# Patient Record
Sex: MALE | Race: BLACK OR AFRICAN AMERICAN | Employment: UNEMPLOYED | URBAN - METROPOLITAN AREA
[De-identification: names, ages, dates, MRNs, and addresses within clinical notes are randomized per-mention and may not be internally consistent; named-entity substitution may affect disease eponyms.]

---

## 2021-09-05 ENCOUNTER — HOSPITAL ENCOUNTER (EMERGENCY)
Age: 66
Discharge: HOME | End: 2021-09-05
Attending: EMERGENCY MEDICINE
Payer: MEDICAID

## 2021-09-05 ENCOUNTER — APPOINTMENT (OUTPATIENT)
Dept: GENERAL RADIOLOGY | Age: 66
End: 2021-09-05
Attending: PHYSICIAN ASSISTANT
Payer: MEDICAID

## 2021-09-05 VITALS
WEIGHT: 240 LBS | HEART RATE: 82 BPM | TEMPERATURE: 98.1 F | RESPIRATION RATE: 16 BRPM | SYSTOLIC BLOOD PRESSURE: 140 MMHG | DIASTOLIC BLOOD PRESSURE: 90 MMHG | BODY MASS INDEX: 39.99 KG/M2 | HEIGHT: 65 IN | OXYGEN SATURATION: 98 %
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
## 2021-09-05 DIAGNOSIS — S46.911A ELBOW STRAIN, RIGHT, INITIAL ENCOUNTER: ICD-10-CM

## 2021-09-05 DIAGNOSIS — M19.029 ELBOW ARTHRITIS: ICD-10-CM

## 2021-09-05 PROCEDURE — 73080 X-RAY EXAM OF ELBOW: CPT

## 2021-09-05 PROCEDURE — 99283 EMERGENCY DEPT VISIT LOW MDM: CPT

## 2021-09-05 RX ORDER — DICLOFENAC POTASSIUM 50 MG/1
50 TABLET, FILM COATED ORAL
Qty: 30 TABLET | Refills: 0 | Status: SHIPPED | OUTPATIENT
Start: 2021-09-05

## 2021-09-05 NOTE — Clinical Note
[id number] [address] EMERGENCY DEPT  [address] [facility] [id number]  [phone]    Work/School Note    Date: 9/5/2021    To Whom It May concern: Severa Leeks Choice was seen and treated today in the emergency room by the following provider(s):  Attending Provider: Jaspreet Stahl MD  Physician Assistant: CAROLEE Grossman. Severa Leeks Choice is excused from work/school on 09/05/21 and 09/06/21. He is medically clear to return to work/school on 9/7/2021.        Sincerely,          CAROLEE Cary

## 2021-09-05 NOTE — ED TRIAGE NOTES
Pt states he fell on Friday and injured right elbow. Pt states he has had several falls recently.      Brii Lawrence RN

## 2021-09-05 NOTE — ED NOTES
I have reviewed discharge instructions with the patient. The patient verbalized understanding. Patient left ED via Discharge Method: ambulatory to Home with self    Opportunity for questions and clarification provided. Patient given 1 scripts. To continue your aftercare when you leave the hospital, you may receive an automated call from our care team to check in on how you are doing. This is a free service and part of our promise to provide the best care and service to meet your aftercare needs.  If you have questions, or wish to unsubscribe from this service please call [phone]. Thank you for Choosing our [facility] Emergency Department.

## 2021-09-05 NOTE — ED PROVIDER NOTES
Patient states that he tripped and fell subsequently landing on an outstretched arm. He has pain to his right elbow. He is right-handed. It hurts to bend it. He did not hit his head nor did he have any loss of consciousness. There are no open wounds. He did ambulate to triage without difficulty and is well-hydrated. The history is provided by the patient. Fall  The accident occurred 2 days ago. There was no blood loss. The point of impact was the left elbow and right elbow. The pain is present in the right elbow. The pain is at a severity of 8/10. The pain is moderate. He was ambulatory at the scene. There was no entrapment after the fall. There was no drug use involved in the accident. There was no alcohol use involved in the accident. Pertinent negatives include no visual change, no fever, no numbness, no abdominal pain, no bowel incontinence, no nausea, no vomiting, no hematuria, no headaches, no extremity weakness, no hearing loss, no loss of consciousness, no tingling and no laceration. The symptoms are aggravated by activity. He has tried nothing for the symptoms. Past Medical History:   Diagnosis Date    Heart failure ([address]     Hypertension        Past Surgical History:   Procedure Laterality Date    HX ORTHOPAEDIC  shoulder         History reviewed. No pertinent family history. Social History     Socioeconomic History    Marital status:      Spouse name: Not on file    Number of children: Not on file    Years of education: Not on file    Highest education level: Not on file   Occupational History    Not on file   Tobacco Use    Smoking status: Never Smoker   Substance and Sexual Activity    Alcohol use:  Yes    Drug use: No    Sexual activity: Never   Other Topics Concern    Not on file   Social History Narrative    Not on file     Social Determinants of Health     Financial Resource Strain:     Difficulty of Paying Living Expenses:    Food Insecurity:     Worried About Running Out of Food in the Last Year:     Doni of Food in the Last Year:    Transportation Needs:     Lack of Transportation (Medical):  Lack of Transportation (Non-Medical):    Physical Activity:     Days of Exercise per Week:     Minutes of Exercise per Session:    Stress:     Feeling of Stress :    Social Connections:     Frequency of Communication with Friends and Family:     Frequency of Social Gatherings with Friends and Family:     Attends Gnosticist Services:     Active Member of Clubs or Organizations:     Attends Club or Organization Meetings:     Marital Status:    Intimate Partner Violence:     Fear of Current or Ex-Partner:     Emotionally Abused:     Physically Abused:     Sexually Abused: ALLERGIES: Patient has no known allergies. Review of Systems   Constitutional: Negative. Negative for fever. HENT: Negative. Eyes: Negative. Respiratory: Negative. Cardiovascular: Negative. Gastrointestinal: Negative. Negative for abdominal pain, bowel incontinence, nausea and vomiting. Genitourinary: Negative. Negative for hematuria. Musculoskeletal: Negative. Negative for extremity weakness. Right elbow pain   Skin: Negative. Neurological: Negative. Negative for tingling, loss of consciousness, numbness and headaches. Psychiatric/Behavioral: Negative. All other systems reviewed and are negative. Vitals:    09/05/21 0932   BP: (!) 140/90   Pulse: 82   Resp: 16   Temp: 98.1 °F (36.7 °C)   SpO2: 98%   Weight: 108.9 kg (240 lb)   Height: 5' 5\" (1.651 m)            Physical Exam  Vitals and nursing note reviewed. Constitutional:       Appearance: He is well-developed. HENT:      Head: Normocephalic and atraumatic.       Right Ear: External ear normal.      Left Ear: External ear normal.      Nose: Nose normal.      Mouth/Throat:      Mouth: Mucous membranes are moist.   Eyes:      Conjunctiva/sclera: Conjunctivae normal.      Pupils: Pupils are equal, round, and reactive to light. Cardiovascular:      Rate and Rhythm: Normal rate. Pulses: Normal pulses. Pulmonary:      Effort: Pulmonary effort is normal.   Abdominal:      Palpations: Abdomen is soft. Musculoskeletal:         General: Tenderness and signs of injury present. No swelling. Right elbow: Swelling present. Decreased range of motion. Tenderness present. Arms:       Cervical back: Normal range of motion and neck supple. Skin:     General: Skin is warm and dry. Findings: No laceration. Neurological:      Mental Status: He is alert and oriented to person, place, and time. Deep Tendon Reflexes: Reflexes are normal and symmetric. Psychiatric:         Behavior: Behavior normal.         Thought Content: Thought content normal.         Judgment: Judgment normal.          MDM  Number of Diagnoses or Management Options     Amount and/or Complexity of Data Reviewed  Tests in the radiology section of CPT®: ordered and reviewed    Risk of Complications, Morbidity, and/or Mortality  Presenting problems: moderate  Diagnostic procedures: moderate  Management options: moderate    Patient Progress  Patient progress: stable         Procedures        The patient was observed in the ED. Results Reviewed:  XR ELBOW RT MIN 3 V   Final Result   Osteoarthritis. No acute findings. Rest, ice, elevate, avoid painful activities. ED if worse. Follow up with Ortho for recheck. Ace wrap applied for comfort. Cataflam as directed if needed for pain. Return to the ED if worsening in any way. He is stable for discharge and ambulatory out of the ED without difficulty at this time. I discussed the results of all labs, procedures, radiographs, and treatments with the patient and available family. Treatment plan is agreed upon and the patient is ready for discharge.   All voiced understanding of the discharge plan and medication instructions or changes as appropriate. Questions about treatment in the ED were answered. All were encouraged to return should symptoms worsen or new problems develop.

## 2021-09-05 NOTE — DISCHARGE INSTRUCTIONS
Rest, ice, elevate, avoid painful activities. ED if worse. Follow up with Ortho for recheck. Use ace for comfort.

## 2023-05-10 RX ORDER — SPIRONOLACTONE 25 MG/1
25 TABLET ORAL DAILY
COMMUNITY
Start: 2008-10-09

## 2023-05-10 RX ORDER — CARVEDILOL 6.25 MG/1
TABLET ORAL 2 TIMES DAILY WITH MEALS
COMMUNITY

## 2023-05-10 RX ORDER — LISINOPRIL 10 MG/1
5 TABLET ORAL DAILY
COMMUNITY
Start: 2008-10-09

## 2023-05-10 RX ORDER — POTASSIUM CHLORIDE 750 MG/1
10 TABLET, FILM COATED, EXTENDED RELEASE ORAL DAILY
COMMUNITY
Start: 2008-10-09

## 2023-05-10 RX ORDER — CYCLOBENZAPRINE HCL 10 MG
10 TABLET ORAL 3 TIMES DAILY PRN
COMMUNITY

## 2023-05-10 RX ORDER — FUROSEMIDE 40 MG/1
40 TABLET ORAL 2 TIMES DAILY
COMMUNITY
Start: 2008-10-09

## 2023-05-10 RX ORDER — DICLOFENAC POTASSIUM 50 MG/1
50 TABLET, FILM COATED ORAL 3 TIMES DAILY PRN
COMMUNITY
Start: 2021-09-05